# Patient Record
(demographics unavailable — no encounter records)

---

## 2024-11-14 NOTE — HISTORY OF PRESENT ILLNESS
[Currently Active] : currently active [Men] : men [Condoms] : Condoms [TextBox_4] : Joycelyn is a 39 y/o  who presents today for an annual exam. She has c/o today of recurrent yeast and BV. She is in the army, often in the field. Rx for fluconazole and Metrogel issued to pharm with refills of each.  She also reports getting very hot on occassion, without exertion and it resolves spontaneously. She has regular, heavy menses. She does not want to start birth control for relief.  She is sexually active and a s of last annual stated she was seeking pregnancy. She is no longer seeking pregnancy.   [FreeTextEntry1] : 10/24/24

## 2024-11-14 NOTE — DISCUSSION/SUMMARY
[FreeTextEntry1] : 1) pap performed 2) rx for screening mammo issued for baseline 3) rx for Fluconazole and Metrogel issued 2nd to c/o recurrent BV/Yeast  4) options for relief of menorrhagia discussed; pt not interested on hormonal therapy for relief 5) diet and exercise recommendations reviewed for weight loss  REturn to office in one year,sooner prn

## 2024-11-27 NOTE — COUNSELING
[Potential consequences of obesity discussed] : Potential consequences of obesity discussed [Encouraged to maintain food diary] : Encouraged to maintain food diary [Encouraged to increase physical activity] : Encouraged to increase physical activity [Target Wt Loss Goal ___] : Weight Loss Goals: Target weight loss goal [unfilled] lbs [Decrease Portions] : decrease portions [Keep Food Diary] : keep food diary [Good understanding] : Patient has a good understanding of disease, goals and obesity follow-up plan [FreeTextEntry4] : 15

## 2024-11-27 NOTE — ASSESSMENT
[FreeTextEntry1] : . CPE Labs  Mild SOB on exertion only during prolonged running, started running 2 months ago 3x per week 2 miles per session. No other cardiac sx, no h/o cardiac disease. Low suspicion for cardiac etiology based on otherwise good exercise tolerance. EKG performed today in clinic personally reviewed. No ST changes, no T Wave inversions, good R wave progression Hx of heavy menses possible iron deficiency, check labs  If labs noncontributory the etiology is likely deconditioning, recommend gradual increase in physical activity to increase endurance.    Obesity Reviewed diet, exercise (150 min/moderate intensity exercise weekly), lifestyle modifications. Discussed the benefits of weight loss with pt, and risks associated with obesity. Pt is receptive to lifestyle modification techniques to lose weight - at least 30mins-1hr aerobic exercises/day x5 days per week advised. Decreased food portion sizes, increase in whole grains, assorted vegetables and fruits and decreased sugar beverages discussed and encouraged. Calculated TDEE 2500 calories, recommended nutrition tracker song Breakfast fruits, teas, bars, smoothies. Lunch and dinner meal preps. Eats out 1x per week  Cervical Cancer Screening- Pap 11/2024 WNL  Flu vaccine declined

## 2024-11-27 NOTE — HISTORY OF PRESENT ILLNESS
[FreeTextEntry1] : CPE, SOB on exertion  [de-identified] : 38F active duty  here for CPE   Patient exercises 3-5x per week during  drills, runs 2 miles 3x per week since September. She reports that over the last year since returning to  duty she's had difficulty catching her breath after 3-5 minutes of running. When this happens she usually slows her pace and continues her run for 10-15 minutes but still feels SOB even when she slows down. She does weight lifting and leander without difficulties. No chest pain or tightness. No prior history of cardiac problems.

## 2024-11-27 NOTE — HEALTH RISK ASSESSMENT
[Good] : ~his/her~  mood as  good [No] : No [1 or 2 (0 pts)] : 1 or 2 (0 points) [Never (0 pts)] : Never (0 points) [0] : 2) Feeling down, depressed, or hopeless: Not at all (0) [PHQ-2 Negative - No further assessment needed] : PHQ-2 Negative - No further assessment needed [With Family] : lives with family [Employed] : employed [Fully functional (bathing, dressing, toileting, transferring, walking, feeding)] : Fully functional (bathing, dressing, toileting, transferring, walking, feeding) [Fully functional (using the telephone, shopping, preparing meals, housekeeping, doing laundry, using] : Fully functional and needs no help or supervision to perform IADLs (using the telephone, shopping, preparing meals, housekeeping, doing laundry, using transportation, managing medications and managing finances) [Never] : Never [Reports changes in hearing] : Reports no changes in hearing [Reports changes in vision] : Reports no changes in vision [de-identified] : Son  [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

## 2024-12-11 NOTE — ASSESSMENT
[FreeTextEntry1] :   physical forms completed, copy scanned in chart  SOB on exertion EKG performed last visit WNL While I suspect the cause of her SOB is likely deconditioning based on absence of other worrisome cardiac symptoms, she has persistence of her SOB and now is requesting to be exempt from her  running drills. Will make the referral to Cardiology to rule out any cardiac etiology.    A total of 30 minutes was spent on today's encounter consisting of chart review, reviewing and discussing prior results and notes, history taking, performing medically appropriate examination, counselling, educating, documentation, and care coordination.

## 2024-12-11 NOTE — HISTORY OF PRESENT ILLNESS
[FreeTextEntry1] : completion of forms [de-identified] : 38F here for follow up on SOB and completion of forms.  Patient has forms requesting exemption for running for her  physical exam, electing to walk instead.  Still having SOB with exertion as documented in previous note: Mild SOB on exertion only during prolonged running, started running 3 months ago 3x per week 2 miles per session. Gets SOB ~assisted through the run. 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 All other non-cardio physical activities such as weight lifting and sprinting she has no issues with.   Taking iron + vitamin C for mild iron deficiency anemia, will return in 2 months for recheck

## 2024-12-20 NOTE — REVIEW OF SYSTEMS
[Weight Loss (___ Lbs)] : [unfilled] ~Ulb weight loss [Dyspnea on exertion] : dyspnea during exertion [Negative] : Respiratory

## 2024-12-20 NOTE — DISCUSSION/SUMMARY
[With Me] : with me [___ Week(s)] : in [unfilled] week(s) [EKG obtained to assist in diagnosis and management of assessed problem(s)] : EKG obtained to assist in diagnosis and management of assessed problem(s)

## 2024-12-20 NOTE — HISTORY OF PRESENT ILLNESS
[FreeTextEntry1] : 38 year old reports feeling short of breath while jogging. 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 Denies experiencing chest pain, palpitations or fainting episodes.

## 2024-12-20 NOTE — PHYSICAL EXAM
[Well Developed] : well developed [Well Nourished] : well nourished [No Acute Distress] : no acute distress [Normal] : alert and oriented, normal memory

## 2025-01-03 NOTE — PROCEDURE
[Intrauterine Pregnancy] : intrauterine pregnancy [Yolk Sac] : yolk sac present [Fetal Heart] : fetal heart present [CRL: ___ (mm)] : CRL - [unfilled]Umm [Date: ___] : EDC: [unfilled] [Current GA by Sonogram: ___ (wks)] : Current GA by Sonogram: [unfilled]Uwks [___ day(s)] : [unfilled] days [WNL] : Transvaginal OB Sonogram WNL [FreeTextEntry1] : LIKELY TWIN GESTATION WITH DEMISE OF ONE. EMPTY SAC
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
## 2025-01-17 NOTE — PHYSICAL EXAM
[Chaperone Present] : A chaperone was present in the examining room during all aspects of the physical examination [FreeTextEntry2] : REFUGIO Banegas [Appropriately responsive] : appropriately responsive [Alert] : alert [No Acute Distress] : no acute distress [Oriented x3] : oriented x3 [Labia Majora] : normal [Labia Minora] : normal [Normal] : normal [Uterine Adnexae] : normal
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
## 2025-01-31 NOTE — PLAN
[FreeTextEntry1] : 37 yo s/p  doing well. - quick start Nuvaring - 7 days of back up - reviewed skipping periods cleared to return to all activities

## 2025-02-13 NOTE — HISTORY OF PRESENT ILLNESS
[FreeTextEntry1] : f/u anemia  [de-identified] : 38F h/o iron deficiency anemia, SOB on exertion here for follow up.  During the winter she hasn't been running for her  physical activity, switching to weight training. Since she hasn't been doing much cardio she hasn't had any of the previously evaluated SOB. She has been improving her diet lost 10 lbs since last visit. Taking iron supplementation intermittently not daily. She saw Cardio with normal TTE, no further workup recommended.

## 2025-02-13 NOTE — ASSESSMENT
[FreeTextEntry1] :  SOB likely 2/2 deconditioning vs iron deficiency anemia S/p 2 month iron supplementation intermittently Check iron levels  EKG and TTE wnl. Evaluated by Cardiology no further workup needed at this time.   Obesity 10 lbs weight loss congratulated continue lifestyle interventions

## 2025-05-30 NOTE — PHYSICAL EXAM
[Chaperone Declined] : Chaperone offered however refused by patient, [FreeTextEntry2] :  [Normal] : uterus [No Bleeding] : there was no active vaginal bleeding [Enlarged ___ wks] : enlarged [unfilled] ~Uweeks [Uterine Adnexae] : were not tender and not enlarged

## 2025-05-30 NOTE — CHIEF COMPLAINT
[Urgent Visit] : Urgent Visit [FreeTextEntry1] : PT PRESENTS FOR CONFIRMATION OF PREGNANCY. REPORTS A HISTORY OF AN INCOMPETENT CERVIX IN A PRIOR PREGNANCY.

## 2025-06-24 NOTE — FAMILY HISTORY
[Reported Family History Of Birth Defects] : no congenital heart defects [Peter-Sachs Carrier] : no Peter-Sachs [Family History] : no mental retardation/autism [Reported Family History Of Genetic Disease] : no history of child defect in child of baby father

## 2025-06-24 NOTE — VITALS
[LMP (date): ___] : LMP was on [unfilled] [GA =___ Weeks] : which calculates to a GA of [unfilled] weeks [GA= ___ Days] : and [unfilled] day(s) [NIRMAL by LMP (date): ___] : The calculated NIRMAL by LMP is [unfilled] [By LMP] : this is the final NIRMAL

## 2025-06-24 NOTE — OB HISTORY
[Definite:  ___ (Date)] : the last menstrual period was [unfilled] [Normal Amount/Duration] : was of a normal amount and duration [Regular Cycle Intervals] : periods have been regular [Pregnancy History] : boy [___] : at [unfilled] weeks GA [LMP: ___] : LMP: [unfilled] [NIRMAL: ___] : NIRMAL: [unfilled] [EGA: ___ wks] : EGA: [unfilled] wks [Spontaneous] : Spontaneous conception [Spotting Between  Menses] : no spotting between menses

## 2025-06-24 NOTE — CHIEF COMPLAINT
[G ___] : G [unfilled] [P ___] : P [unfilled] [de-identified] : history of cervical insufficiency/pre-term delivery, ama, prediabetes

## 2025-06-24 NOTE — ACTIVE PROBLEMS
[Diabetes Mellitus] : no diabetes mellitus [Hypertension] : no hypertension [Heart Disease] : no heart disease [Autoimmune Disease] : no autoimmune disease [Renal Disease] : no kidney disease, no UTI [Neurologic Disorder] : no neurologic disorder, no epilepsy [Psychiatric Disorders] : no psychiatric disorders [Depression] : no depression, no post partum depression [Hepatic Disorder] : no hepatitis, no liver disease [Thrombophlebitis] : no varicosities, no phlebitis [Thyroid Disorder] : no thyroid dysfunction [Trauma] : no trauma/violence

## 2025-06-24 NOTE — HISTORY OF PRESENT ILLNESS
[FreeTextEntry1] : Joycelyn is doing well today. Denies cramping or bleeding. NT ultrasound done and within normal limits. She is here for an M consult due to history of cervical insufficiency and  birth. With her prior delivery, she reports she presented to the hospital at 26 weeks with pelvic pressure and was found to be fully dilated. She subsequently had an . She never felt pain or contractions.  She also is AMA and has prediabetes as well as sickle cell trait. Her partner tested negative for sickle cell per her report.

## 2025-06-24 NOTE — DISCUSSION/SUMMARY
[FreeTextEntry1] : We had the pleasure of seeing Ms. Scott for a Maternal-Fetal Medicine consultation today. She is a 38yo  at 11 weeks and 3 days of gestation with a history of  birth and cervical insufficiency, AMA, prediabetes, sickle cell trait, and obesity.  Cervical Insufficiency: We discussed that cervical insufficiency is used to describe the inability of the uterine cervix to retain a pregnancy in the absence of the signs and symptoms of clinical contractions, or labor, or both in the second trimester. The pathophysiology of cervical insufficiency is still poorly understood. Cervical cerclage placement is a surgical procedure that can be performed in an attempt to maintain the structural integrity of the cervix in order to prolong gestation and improve obstetrical outcomes. A history-indicated or prophylactic cerclage may be placed when there is a history of one or more second-trimester pregnancy losses related to painless cervical dilation and in the absence of labor or abruptio placentae or if the woman had a prior cerclage placed due to cervical insufficiency in the second trimester. Given her history, it is likely that she has a component of cervical insufficiency. We therefore recommend a history-indicated cerclage (which she is scheduled for already). History-indicated cerclages typically are placed at approximately 13-14 weeks of gestation. Cervical length surveillance with transvaginal ultrasound is recommended every 2 weeks following cerclage placement prior to 24 weeks. In patients with no complications, transvaginal Spears cerclage removal is recommended at 36-37 weeks of gestation.   Advanced Maternal Age (AMA): Women who are of advanced maternal age (typically defined as age 35 at delivery) are at an increased risk for fetal aneuploidy, spontaneous , congenital malformations, diabetes, and hypertensive disorders of pregnancy including preeclampsia,  delivery, stillbirth, and low birth weight neonates. She had genetic counseling to review and discuss invasive and non-invasive options to detect aneuploidy. She underwent NIPS testing, which was low risk, and declined diagnostic testing with either chorionic villus sampling or genetic amniocentesis. I told her that advanced maternal age has been associated with a higher incidence of gestational diabetes, preeclampsia, and eclampsia. I recommended prophylactic aspirin alternating 81mg/162mg daily to reduce her risk of developing preeclampsia and to discontinue taking aspirin at 36-37 weeks of gestation.   Prediabetes: She has a diagnosis of prediabetes based on a hemoglobin A1C of 5.8% on 2025. Prediabetes is a risk factor for gestational diabetes, and early testing for gestational diabetes is recommended. We recommend testing for gestational diabetes with a 3-hour glucose tolerance test prior to 14 weeks of gestation. If this is diagnostic, she should be referred for dietary counseling and follow up M consultation for further management. If this early test is negative, then she should have repeat diabetes screening at 24-28 weeks.    Obesity in Pregnancy: Obesity is associated with an increased risk for gestational diabetes, hypertension in pregnancy, postpartum hemorrhage secondary to uterine atony, and venous thromboembolism. The [employer] recommends obese pregnant patients gain approximately 11-20 pounds during gestation. Additionally, an early diabetes screen is recommended and if normal a repeat at 24 to 28 weeks of gestation is advised.    Sickle Cell Trait: Sickle cell trait is a generally asymptomatic carrier condition in which one allele of the beta globin gene carries the sickle hemoglobin mutation and the other is normal, producing hemoglobin AS (Hb AS). Her partner has reportedly tested negative. Data on the risk of pregnancy complications in individuals with sickle cell traits are conflicting, however women with SCT fare at an increased rate of pregnancy-related hypertensive disorders including gestational hypertension and preeclampsia.  Summary of recommendations: -History indicated cerclage as scheduled -Follow up transvaginal ultrasound for cervical length 2 weeks after cerclage, then every two weeks prior to 24 weeks -Early glucose tolerance test, and repeat gestational diabetes screening at 24-28 weeks if early testing is normal -Anatomy ultrasound at 20 weeks -Baby aspirin alternating 81mg/162mg daily from 12 weeks until 36-37 weeks  At the end of our discussion, the patient indicated that her questions were answered, and she seemed satisfied with our discussion. Please do not hesitate to contact us with any questions.     Sincerely,  Dinora Silva MD  Fellow, Maternal-Fetal Medicine   James Campos MD   Attending Maternal-Fetal Medicine 
n/a